# Patient Record
Sex: MALE | Race: WHITE | NOT HISPANIC OR LATINO | URBAN - METROPOLITAN AREA
[De-identification: names, ages, dates, MRNs, and addresses within clinical notes are randomized per-mention and may not be internally consistent; named-entity substitution may affect disease eponyms.]

---

## 2017-01-27 ENCOUNTER — GENERIC CONVERSION - ENCOUNTER (OUTPATIENT)
Dept: OTHER | Facility: OTHER | Age: 47
End: 2017-01-27

## 2017-04-24 ENCOUNTER — GENERIC CONVERSION - ENCOUNTER (OUTPATIENT)
Dept: OTHER | Facility: OTHER | Age: 47
End: 2017-04-24

## 2018-01-12 NOTE — RESULT NOTES
Verified Results  (Q) LIPID PANEL WITH REFLEX TO DIRECT LDL 07Oct2016 07:48AM Laureen Rodriguez   REPORT COMMENT:  FASTING:YES     Test Name Result Flag Reference   CHOLESTEROL, TOTAL 159 mg/dL  125-200   HDL CHOLESTEROL 44 mg/dL  > OR = 40   TRIGLICERIDES 688 mg/dL  <150   LDL-CHOLESTEROL 93 mg/dL (calc)  <130   Desirable range <100 mg/dL for patients with CHD or  diabetes and <70 mg/dL for diabetic patients with  known heart disease  CHOL/HDLC RATIO 3 6 (calc)  < OR = 5 0   NON HDL CHOLESTEROL 115 mg/dL (calc)     Target for non-HDL cholesterol is 30 mg/dL higher than   LDL cholesterol target

## 2018-01-18 NOTE — PROGRESS NOTES
Assessment    1  Encounter to establish care with new doctor (V65 8) (Z71 89)   2  Hypertension (401 9) (I10)   3  Rheumatoid arthritis (714 0) (M06 9)    Plan  Hypertension    · AmLODIPine Besylate 5 MG Oral Tablet; TAKE 1 TABLET DAILY FOR BLOOD  PRESSURE   · Lisinopril 40 MG Oral Tablet; TAKE 1 TABLET DAILY AS DIRECTED    Discussion/Summary  Discussion Summary:   Patient is new to our practice  He has rheumatoid arthritis and hypertension  He will be seeing a new rheumatologist for RA - which affects his ankles, feet, hands and wrists  I did refill his   bp meds  His BP is borderline, but this is his first visit here, so I will keep meds the same and recheck in a few weeks  Medication SE Review and Pt Understands Tx: The treatment plan was reviewed with the patient/guardian  The patient/guardian understands and agrees with the treatment plan      Chief Complaint  Chief Complaint Free Text Note Form: New patient here to get established; no records available at this time  History of Present Illness  HPI: Patient is here to establish  He has a history of hypertension  He also has rheumatoid arthritis - had been on Humara/Arencia/Prednisone - 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  (Dr Michael Stiles)  He had to switch doctors because of his insurance  March  He is scheduled with a rheumatologist at [facility]  He has not had much success with many treatments  Review of Systems  Complete-Male:   Constitutional: No fever or chills, feels well, no tiredness, no recent weight gain or weight loss  Cardiovascular: No complaints of slow heart rate, no fast heart rate, no chest pain, no palpitations, no leg claudication, no lower extremity  Respiratory: No complaints of shortness of breath, no wheezing, no cough, no SOB on exertion, no orthopnea or PND  Gastrointestinal: No complaints of abdominal pain, no constipation, no nausea or vomiting, no diarrhea or bloody stools  Musculoskeletal: as noted in HPI  Neurological: No compliants of headache, no confusion, no convulsions, no numbness or tingling, no dizziness or fainting, no limb weakness, no difficulty walking  Past Medical History  Active Problems And Past Medical History Reviewed: The active problems and past medical history were reviewed and updated today  Family History    1  Denied: Family history of cardiac disorder   2  Denied: Family history of diabetes mellitus   3  Family history of malignant neoplasm of brain (V16 8) (Z80 8)    4  Family history of Bell's palsy (V17 2) (Z82 0)   5  Denied: Family history of cardiac disorder   6  Denied: Family history of diabetes mellitus    7  Denied: Family history of cardiac disorder   8  Denied: Family history of diabetes mellitus  Family History Reviewed: The family history was reviewed and updated today  Social History    · Drinks wine (V49 89) (Z78 9)   · Former smoker (M23 89) (M33 515)  Social History Reviewed: The social history was reviewed and updated today  The social history was reviewed and is unchanged  Current Meds   1  AmLODIPine Besylate 5 MG Oral Tablet; TAKE 1 TABLET DAILY FOR BLOOD   PRESSURE; Therapy: (Recorded:28Jan2016) to Recorded   2  Calcium Citrate TABS; Therapy: (Tammi Downs) to Recorded   3  Lisinopril 40 MG Oral Tablet; TAKE 1 TABLET DAILY AS DIRECTED; Therapy: (Tammi Downs) to Recorded   4  Naproxen 500 MG Oral Tablet; TAKE 1 TABLET TWICE DAILY; Therapy: (Tammi Downs) to Recorded   5  Super B-Complex Oral Capsule; TAKE 1 CAPSULE DAILY; Therapy: [id number] to Recorded   6  Vitamin D 2000 UNIT Oral Tablet; Take 1 tablet daily; Therapy: [id number] to Recorded    Allergies    1   No Known Drug Allergies    Vitals  Vital Signs [Data Includes: Current Encounter]    Recorded: [id number] 12:16PM   Temperature 99 F   Heart Rate 79   Systolic 913   Diastolic 90   Height 5 ft 11 in   Weight 278 lb    BMI Calculated 38 77   BSA Calculated 2 42 O2 Saturation 97, RA     Physical Exam    Constitutional   General appearance: No acute distress, well appearing and well nourished  Ears, Nose, Mouth, and Throat   Otoscopic examination: Tympanic membrance translucent with normal light reflex  Canals patent without erythema  Oropharynx: Normal with no erythema, edema, exudate or lesions  Pulmonary   Respiratory effort: No increased work of breathing or signs of respiratory distress  Auscultation of lungs: Clear to auscultation, equal breath sounds bilaterally, no wheezes, no rales, no rhonci  Cardiovascular   Auscultation of heart: Normal rate and rhythm, normal S1 and S2, without murmurs  Carotid pulses: Normal     Lymphatic   Palpation of lymph nodes in neck: No lymphadenopathy      Musculoskeletal   Gait and station: Normal     Psychiatric   Orientation to person, place and time: Normal     Mood and affect: Normal          Results/Data  Encounter Results   PHQ-2 Adult Depression Screening 28Jan2016 12:23PM User, Kris     Test Name Result Flag Reference   PHQ-2 Adult Depression Score 0     Q1: 0, Q2: 0   PHQ-2 Adult Depression Screening Negative         Future Appointments    Date/Time Provider Specialty Site   03/10/2016 12:00 PM Yuli Marquez DO Family Medicine [address]   Electronically signed by : Maikel Yang DO; Jan 31 2016  6:52AM EST                       (Author)

## 2018-03-26 ENCOUNTER — TELEPHONE (OUTPATIENT)
Dept: FAMILY MEDICINE CLINIC | Facility: CLINIC | Age: 48
End: 2018-03-26

## 2018-03-26 NOTE — TELEPHONE ENCOUNTER
----- Message from Darin Dumont sent at 3/26/2018  1:56 PM EDT -----  Regarding: TCM patient  Patient was D/C'd from [facility] on 03/23/18 for  Cervical spondylosis with myelopathy

## 2022-09-06 ENCOUNTER — OFFICE VISIT (OUTPATIENT)
Dept: OBGYN CLINIC | Facility: CLINIC | Age: 52
End: 2022-09-06
Payer: MEDICARE

## 2022-09-06 VITALS — WEIGHT: 282 LBS | BODY MASS INDEX: 38.19 KG/M2 | HEIGHT: 72 IN

## 2022-09-06 DIAGNOSIS — M19.279 OSTEOARTHRITIS OF TALONAVICULAR JOINT DUE TO INFLAMMATORY ARTHRITIS: Primary | ICD-10-CM

## 2022-09-06 DIAGNOSIS — M19.90 OSTEOARTHRITIS OF TALONAVICULAR JOINT DUE TO INFLAMMATORY ARTHRITIS: Primary | ICD-10-CM

## 2022-09-06 PROCEDURE — 99203 OFFICE O/P NEW LOW 30 MIN: CPT | Performed by: ORTHOPAEDIC SURGERY

## 2022-09-06 RX ORDER — UPADACITINIB 15 MG/1
TABLET, EXTENDED RELEASE ORAL
COMMUNITY

## 2022-09-06 RX ORDER — VALSARTAN AND HYDROCHLOROTHIAZIDE 320; 25 MG/1; MG/1
1 TABLET, FILM COATED ORAL DAILY
COMMUNITY
Start: 2022-07-08

## 2022-09-06 RX ORDER — BUPROPION HYDROCHLORIDE 200 MG/1
TABLET, EXTENDED RELEASE ORAL
COMMUNITY
Start: 2022-08-02

## 2022-09-06 RX ORDER — AMLODIPINE BESYLATE 10 MG/1
10 TABLET ORAL DAILY
COMMUNITY
Start: 2022-08-17

## 2022-09-06 RX ORDER — METOPROLOL SUCCINATE 100 MG/1
TABLET, EXTENDED RELEASE ORAL
COMMUNITY
Start: 2022-08-17

## 2022-09-06 NOTE — PROGRESS NOTES
JP Singleton  Attending, Orthopaedic Surgery  [facility] [address] [facility]      ORTHOPAEDIC FOOT AND ANKLE CLINIC VISIT     Assessment:     Encounter Diagnosis   Name Primary? 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, left foot Yes            Plan:   · The patient verbalized understanding of exam findings and treatment plan  We engaged in the shared decision-making process and treatment options were discussed at length with the patient  Surgical and conservative management discussed today along with risks and benefits  · Rober Ji has diagnostic studies and a physical examination consistent with end stage talonavicular joint osteoarthritis  The operative and non operative treatments were discussed with the patient today  · It is recommended that he perform a cortisone injection with x ray guidance into his talonavicular joint  He is to monitor his symptoms for 72 hours after the injection  If he gets significant relief but symptoms return from the injection then a fusion of his talonavicular joint would be warranted  The injection will also demonstrate how much of his pain is coming from his TN joint versus his ankle which also seems to have some arthritis present   Return in about 3 months (around 12/6/2022) for Recheck of left foot and ankle  History of Present Illness:   Chief Complaint:   Chief Complaint   Patient presents with   Bri is a 46 y o  male who is being seen for left foot  Patient reports that he has had multiple bracing and injection into his ankle with his rheumatologist and Dr Precious Herrera  Pain is localized at with minimal radiating and described as sharp and severe  Patient denies numbness, tingling or radicular pain  Denies history of neuropathy  Patient does not smoke, does not have diabetes and does not take blood thinners    Patient denies family history of anesthesia complications and has not had any complications with anesthesia  Patient has a history of rheumatoid arthritis  Pain/symptom timing:  Worse during the day when active  Pain/symptom context:  Worse with activites and work  Pain/symptom modifying factors:  Rest makes better, activities make worse  Pain/symptom associated signs/symptoms: none    Prior treatment   · NSAIDsYes   · Injections Yes   · Bracing/Orthotics Yes  [address]  and MAFO bracing about 10 years ago with minimal benefit  Prescribed by Dr Regis Feliciano and Dr Dede Montano  · Physical Therapy Yes     Orthopedic Surgical History:   See below    Past Medical, Surgical and Social History:  Past Medical History:  has no past medical history on file  Problem List: does not have a problem list on file  Past Surgical History:  has no past surgical history on file  Family History: family history is not on file  Social History:  reports that he has never smoked  He has never used smokeless tobacco  He reports current alcohol use  He reports that he does not use drugs  Current Medications: has a current medication list which includes the following prescription(s): amlodipine, bupropion, melatonin, metoprolol succinate, rinvoq, and valsartan-hydrochlorothiazide  Allergies: has No Known Allergies  Review of Systems:  General- denies fever/chills  HEENT- denies hearing loss or sore throat  Eyes- denies eye pain or visual disturbances, denies red eyes  Respiratory- denies cough or SOB  Cardio- denies chest pain or palpitations  GI- denies abdominal pain  Endocrine- denies urinary frequency  Urinary- denies pain with urination  Musculoskeletal- Negative except noted above  Skin- denies rashes or wounds  Neurological- denies dizziness or headache  Psychiatric- denies anxiety or difficulty concentrating    Physical Exam:   Ht 6' (1 829 m)   Wt 128 kg (282 lb)   BMI 38 25 kg/m²   General/Constitutional: No apparent distress: well-nourished and well developed    Eyes: normal ocular motion  Cardio: RRR, Normal S1S2, No m/r/g  Lymphatic: No appreciable lymphadenopathy  Respiratory: Non-labored breathing, CTA b/l no w/c/r  Vascular: No edema, swelling or tenderness, except as noted in detailed exam   Integumentary: No impressive skin lesions present, except as noted in detailed exam   Neuro: No ataxia or tremors noted  Psych: Normal mood and affect, oriented to person, place and time  Appropriate affect  Musculoskeletal: Normal, except as noted in detailed exam and in HPI  Examination    Left    Gait Antalgic   Musculoskeletal Tender to palpation at talonavicular joint    Skin Normal       Nails Normal    Range of Motion  10 degrees dorsiflexion, 30 degrees plantarflexion  Subtalar motion: normal    Stability Stable    Muscle Strength 5/5 tibialis anterior  5/5 gastrocnemius-soleus  5/5 posterior tibialis  5/5 peroneal/eversion strength  5/5 EHL  5/5 FHL    Neurologic Normal    Sensation Intact to light touch throughout sural, saphenous, superficial peroneal, deep peroneal and medial/lateral plantar nerve distributions  [place] 5 07 filament (10g) testing deferred  Cardiovascular Brisk capillary refill < 2 seconds,intact DP and PT pulses    Special Tests None      Imaging Studies:   3 views of the left foot were taken at an outside facility, reviewed and interpreted independently that demonstrate end stage talonavicular joint osteoarthritis  Reviewed by me personally  Celedonio Lemmings Lachman, MD  Foot & Ankle Surgery   Department [address]      I personally performed the service  Celedonio Lemmings Lachman, MD    Scribe Attestation    I,:  Veronica Kc am acting as a scribe while in the presence of the attending physician :       I,:  Dave Michaels MD personally performed the services described in this documentation    as scribed in my presence :

## 2022-09-06 NOTE — PATIENT INSTRUCTIONS
You have Left talonavicular joint arthritis  This is a wearing away of the cartilage in your foot leading to bone against bone which causes inflammation which causes pain  The nonoperative treatment for this involves a carbon fiber foot plate and injections under fluoroscopic guidance  You may schedule your injection with our musculoskeletal radiology team at your earliest convenience  Go to [employer] and type in "Full length carbon fiber foot plate " Pick an item that looks like the one below  Make sure it is the appropriate size for your foot (match your shoe size)  This insert goes under the orthotic in your shoe to stiffen the shoe  If the injections and carbon fiber foot plate are not effective, you will be a candidate for a midfoot fusion surgery  Midfoot Fusion     What is the midfoot? The midfoot refers to the bones and joints that make up the arch and connect the forefoot (which includes the bones of the toes) to the hindfoot (which includes the ankle bone and the heel bone)  What is a midfoot fusion? Midfoot fusion is a procedure in which the separate bones that make up the arch of the foot are fused into a single mass of bone  Fusion is also referred to as arthrodesis  Fusion eliminates the normal motion that occurs between two bones  At left, this standing X-ray view of a right foot shows a gap between the first and second metatarsals (arrow)  At right, this side X-ray view of the same foot shows loss of alignment  In a healthy foot, the two yellow lines would overlap one another  Midfoot fusion can involve all of the midfoot joints  More commonly just one or a few of the joints are fused  The joints of the midfoot do not bend and move like your knee or elbow  They are designed to be relatively stiff to give your foot strength and support your body   Midfoot fusion does not generally produce much noticeable loss of motion because there is fairly little motion to begin with         What are the goals of a midfoot fusion? The primary goal of midfoot fusion is to decrease pain and improve function  Eliminating the painful motion between arthritic joint surfaces and restoring the bones to their normal positions achieves this  Other goals include the correction of deformity and the return of stability to the arch of the foot  A successful midfoot fusion can achieve these goals and restore more normal walking ability  What signs indicate a midfoot fusion may be needed? The most common reason for midfoot fusion is painful arthritis in the midfoot joints that has not improved with nonsurgical treatment  Other common reasons to do a midfoot fusion include too much motion of one or more of the midfoot joints or deformity of the midfoot  Examples of conditions that may result in midfoot deformity include severe bunions and flatfoot deformity  Midfoot fusion is also indicated for certain acute fractures and joint displacement involving the midfoot  When should I avoid surgery? Midfoot fusion should not be performed if there is active infection or if the patients health is poor enough that the risk of surgery is too high  Conditions such as uncontrolled diabetes and blood flow problems may make a patient a poor candidate for surgery  Other reasons to not perform midfoot fusion include osteoporosis and poor skin quality  Smoking significantly increases the risk that bones will not fuse  General Details of Procedure  Successful midfoot fusion depends on complete removal of all joint surfaces (cartilage) and stable fixation of the joints being fused  Residual cartilage can prevent the bones from fusing together  Failure to achieve adequate stability may allow too           Post-surgery X-rays showing correction   The arrow shows that the gap has been eliminated, and a single line can be drawn through the middle of the foot bones, which means the foot is now in the proper position  Screws and plates were used for the repair  much motion for fusion to occur  Specific Techniques  Midfoot fusion is generally accomplished using one or two incisions on the top of the foot  The length of the incision and how many incisions are necessary is determined by the number of joints to be fused  Careful attention is paid to protecting tendons and nerves  Stability is achieved during midfoot fusion using metal implants such as screws and plates  These are designed to immobilize the joints and allow for the formation of bone across the joint space  This process may involve the addition of bone graft material to fill any gaps that might exist between the bones after the cartilage has been removed  This bone graft material may be taken from another location in the patients body (autograft)  It may also come from donated bone (allograft) or from a synthetic material  A combination of these materials may be used  What happens after surgery? After surgery a period of protection and immobilization is required for successful fusion to occur  A cast is typically placed for the first six to 10 weeks  Weightbearing is not allowed on the affected foot for eight to 12 weeks after surgery  X-rays are usually obtained every four weeks to assess progress of the fusion  Gradually increased weightbearing is allowed as healing progresses  Initial weightbearing is protected in a prefabricated boot with gradual transition to supportive shoes  Physical therapy may be prescribed on a case-by-case basis to help the patients walking and balance  Potential Complications  There are complications that relate to surgery in general  These include risks associated with anesthesia, infection, damage to nerves and blood vessels, and bleeding or blood clots  A major potential complication after midfoot fusion is failure of the bones to fuse (nonunion)   Other complications can include over-correction or under-correction of deformity (malunion)  There can be problems with wound healing  Prominent plates and screws can be painful and may require removal of the hardware  Injury to nerves on the top of the foot can occur  Smoking is one of the leading risks for nonunion  Premature weightbearing can also result in failure of the bones to fuse  Frequently Asked Questions  How much motion in my foot will I lose after midfoot fusion? Motion of the midfoot joints is normally somewhat limited  Loss of that motion after fusion surgery tends to be well tolerated by patients  The more mobile joints of the ankle, hindfoot and forefoot are unaffected by midfoot fusion and thus continue to provide motion to the foot  Will I set off an airport metal detector after midfoot fusion? The strength of the metal detector and the amount of metal implants used determine whether hardware from a midfoot fusion will be detected  It is uncommon for the metal implants to be detectable by airport screening methods  How will I get around after surgery before I am allowed to put any weight on the foot? A combination of devices can be used, including crutches, walkers, knee-rollers, scooters and wheelchairs  Physical therapy is also used to help assess patient needs and improve mobility and safety  Certain patients may benefit from the assistance provided by a skilled nursing facility or post-operative rehabilitation unit  Will the plates and screws have to be removed after midfoot fusion? Metal implants used for midfoot fusion are not routinely removed  Hardware may need to be removed if there is a failure of the fusion or if infection develops  Painful hardware can be removed once the fusion is healed

## 2022-09-13 NOTE — NURSING NOTE
Call placed to patient to discuss upcoming appointment at [facility] radiology department and complete consultation with patient  Patient is having left foot CSI utilizing fluoroscopy guidance  Reviewed patient's allergies, no current anticoagulant medication present, also discussed the pre and post procedure expectations  Reminded patient of location and time expected for procedure, Patient expressed understanding by verbalizing and repeating instructions

## 2022-09-20 ENCOUNTER — HOSPITAL ENCOUNTER (OUTPATIENT)
Dept: RADIOLOGY | Facility: HOSPITAL | Age: 52
Discharge: HOME | End: 2022-09-20
Attending: ORTHOPAEDIC SURGERY
Payer: MEDICARE

## 2022-09-20 DIAGNOSIS — M19.90 OSTEOARTHRITIS OF TALONAVICULAR JOINT DUE TO INFLAMMATORY ARTHRITIS: ICD-10-CM

## 2022-09-20 DIAGNOSIS — M19.279 OSTEOARTHRITIS OF TALONAVICULAR JOINT DUE TO INFLAMMATORY ARTHRITIS: ICD-10-CM

## 2022-09-20 PROCEDURE — 77002 NEEDLE LOCALIZATION BY XRAY: CPT

## 2022-09-20 PROCEDURE — 20605 DRAIN/INJ JOINT/BURSA W/O US: CPT

## 2022-09-20 RX ORDER — LIDOCAINE HYDROCHLORIDE 10 MG/ML
5 INJECTION, SOLUTION EPIDURAL; INFILTRATION; INTRACAUDAL; PERINEURAL
Status: COMPLETED | OUTPATIENT
Start: 2022-09-20 | End: 2022-09-20

## 2022-09-20 RX ORDER — BETAMETHASONE SODIUM PHOSPHATE AND BETAMETHASONE ACETATE 3; 3 MG/ML; MG/ML
6 INJECTION, SUSPENSION INTRA-ARTICULAR; INTRALESIONAL; INTRAMUSCULAR; SOFT TISSUE ONCE
Status: COMPLETED | OUTPATIENT
Start: 2022-09-20 | End: 2022-09-20

## 2022-09-20 RX ORDER — BUPIVACAINE HYDROCHLORIDE 2.5 MG/ML
10 INJECTION, SOLUTION EPIDURAL; INFILTRATION; INTRACAUDAL
Status: COMPLETED | OUTPATIENT
Start: 2022-09-20 | End: 2022-09-20

## 2022-09-20 RX ADMIN — IOHEXOL 2 ML: 300 INJECTION, SOLUTION INTRAVENOUS at 13:15

## 2022-09-20 RX ADMIN — BETAMETHASONE SODIUM PHOSPHATE AND BETAMETHASONE ACETATE 6 MG: 3; 3 INJECTION, SUSPENSION INTRA-ARTICULAR; INTRALESIONAL; INTRAMUSCULAR at 13:15

## 2022-09-20 RX ADMIN — BUPIVACAINE HYDROCHLORIDE 3 ML: 2.5 INJECTION, SOLUTION EPIDURAL; INFILTRATION; INTRACAUDAL; PERINEURAL at 13:15

## 2022-09-20 RX ADMIN — LIDOCAINE HYDROCHLORIDE 5 ML: 10 INJECTION, SOLUTION EPIDURAL; INFILTRATION; INTRACAUDAL; PERINEURAL at 13:15

## 2025-01-02 ENCOUNTER — TELEPHONE (OUTPATIENT)
Dept: SLEEP CENTER | Facility: CLINIC | Age: 55
End: 2025-01-02

## 2025-01-02 NOTE — TELEPHONE ENCOUNTER
Patient of  in his [place] office.    I left a message to schedule a follow up appointment with Dr. Horn here at [facility] .

## 2025-01-20 ENCOUNTER — TELEPHONE (OUTPATIENT)
Dept: SLEEP CENTER | Facility: CLINIC | Age: 55
End: 2025-01-20

## 2025-01-20 NOTE — TELEPHONE ENCOUNTER
Called pt to schedule 1 yr follow up with Dr Horn   ONLY from private office, phone number not in service